# Patient Record
Sex: MALE | Race: WHITE | Employment: STUDENT | URBAN - METROPOLITAN AREA
[De-identification: names, ages, dates, MRNs, and addresses within clinical notes are randomized per-mention and may not be internally consistent; named-entity substitution may affect disease eponyms.]

---
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
## 2022-11-02 NOTE — PATIENT DISCUSSION
Artificial Tears: One drop to both eyes 2-3 times daily. We recommend Systane complete or Refresh evy lubricating eye drops which can be found at any pharmacy.

## 2022-11-30 NOTE — PROCEDURE NOTE: CLINICAL
PROCEDURE NOTE: Punctal Plugs, Extended OU. Diagnosis: Dry Eye Syndrome. Prior to treatment, the risks/benefits/alternatives were discussed. The patient wished to proceed with procedure. Patient tolerated procedure well. There were no complications. Post procedure instructions given. Chemo Brody

## 2022-12-10 NOTE — PATIENT DISCUSSION
1 DAY POST-OP, OS- Ocuflox-1 drop 4 times a day until next visit. Acular-1 drop 4 times a day until next visit. Pred-forte-start using today, 1 drop 4 times a day until next visit. Patient was also given instruction sheet. I personally spent

## 2024-05-22 NOTE — PATIENT DISCUSSION
Rash  Location: Between thighs bilaterally as well as up onto upper thighs and hips.  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  Negative for fever.   HENT: Negative.  Negative for hoarse voice and sore throat.    Eyes: Negative.    Respiratory: Negative.  Negative for shortness of breath and wheezing.    Cardiovascular: Negative.    Gastrointestinal: Negative.  Negative for abdominal pain, diarrhea, nausea and vomiting.   Genitourinary: Negative.    Musculoskeletal: Negative.  Negative for arthralgias and myalgias.   Skin:  Positive for rash.   Neurological: Negative.  Negative for headaches.   All other systems reviewed and are negative.      History reviewed. No pertinent family history.  Social History     Socioeconomic History    Marital status:      Spouse name: Not on file    Number of children: Not on file    Years of education: Not on file    Highest education level: Not on file   Occupational History    Not on file   Tobacco Use    Smoking status: Every Day     Current packs/day: 1.50     Types: Cigarettes     Passive exposure: Past    Smokeless tobacco: Never   Vaping Use    Vaping Use: Never used   Substance and Sexual Activity    Alcohol use: No     Comment: caffeine, 2.5 2 liters of pop    Drug use: No    Sexual activity: Not on file  General:

## 2024-12-29 NOTE — PATIENT DISCUSSION
2 WEEK POST-OP - Doing well. Continue to taper off of Pred Forte drops as directed, new drop instruction sheet given. shortness of breath